# Patient Record
Sex: MALE | Race: AMERICAN INDIAN OR ALASKA NATIVE | Employment: UNEMPLOYED | URBAN - METROPOLITAN AREA
[De-identification: names, ages, dates, MRNs, and addresses within clinical notes are randomized per-mention and may not be internally consistent; named-entity substitution may affect disease eponyms.]

---

## 2019-03-10 ENCOUNTER — HOSPITAL ENCOUNTER (EMERGENCY)
Facility: CLINIC | Age: 19
Discharge: HOME | End: 2019-03-10
Admitting: PHYSICIAN ASSISTANT
Payer: COMMERCIAL

## 2019-03-10 ENCOUNTER — APPOINTMENT (OUTPATIENT)
Dept: GENERAL RADIOLOGY | Facility: CLINIC | Age: 19
End: 2019-03-10
Attending: EMERGENCY MEDICINE
Payer: COMMERCIAL

## 2019-03-10 VITALS
TEMPERATURE: 98 F | WEIGHT: 178 LBS | OXYGEN SATURATION: 100 % | BODY MASS INDEX: 24.11 KG/M2 | RESPIRATION RATE: 16 BRPM | SYSTOLIC BLOOD PRESSURE: 157 MMHG | DIASTOLIC BLOOD PRESSURE: 88 MMHG | HEIGHT: 72 IN

## 2019-03-10 DIAGNOSIS — M25.512 ACUTE PAIN OF LEFT SHOULDER: ICD-10-CM
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
## 2019-03-10 PROCEDURE — 99283 EMERGENCY DEPT VISIT LOW MDM: CPT

## 2019-03-10 PROCEDURE — 73030 X-RAY EXAM OF SHOULDER: CPT | Mod: LT

## 2019-03-10 ASSESSMENT — ENCOUNTER SYMPTOMS
ARTHRALGIAS: 1
JOINT SWELLING: 1
NECK PAIN: 0
NECK STIFFNESS: 0

## 2019-03-10 ASSESSMENT — MIFFLIN-ST. JEOR: SCORE: 1860.4

## 2019-03-10 NOTE — ED AVS SNAPSHOT
Emergency Department  [address] [place] [id number]  Phone:  [phone]  Fax:  [phone]                                    Jj Kennedy   MRN: [id number]    Department:   Emergency Department   Date of Visit:  3/10/2019           After Visit Summary Signature Page    I have received my discharge instructions, and my questions have been answered. I have discussed any challenges I see with this plan with the nurse or doctor.    ..........................................................................................................................................  Patient/Patient Representative Signature      ..........................................................................................................................................  Patient Representative Print Name and Relationship to Patient    ..................................................               ................................................  Date                                   Time    ..........................................................................................................................................  Reviewed by Signature/Title    ...................................................              ..............................................  Date                                               Time          22EPIC Rev 08/18

## 2019-03-11 NOTE — ED PROVIDER NOTES
History   Chief Complaint:  Shoulder injury    HPI   Jj Kennedy is a 18 year old male, who presents with girlfriend to the ED for evaluation of shoulder injury. The patient reports walking 20 minutes ago, slipped on some ice, and fell on his left shoulder. He says the pain is the same as it was when it first occurred. The patient notes it hurts to move the arm and the pain extends to the left side of the neck. The patient denies any head injury, neck injury, loss of consciousness, or any other symptoms or injuries.      Allergies:  No known drug allergies    Medications:    The patient is not currently taking any prescribed medications.    Past Medical History:    Myopia    Past Surgical History:    History reviewed. No pertinent surgical history.    Family History:    Hypertension  Diabetes    Social History:  Smoking status: Never  Alcohol use: No  Marital Status:  Single [1]    Review of Systems   Musculoskeletal: Positive for arthralgias (Left shoulder) and joint swelling. Negative for neck pain and neck stiffness.   All other systems reviewed and are negative.    Physical Exam     Patient Vitals for the past 24 hrs:   BP Temp Temp src Heart Rate Resp SpO2 Height Weight   03/10/19 2144 157/88 98  F (36.7  C) Temporal 60 16 100 % 1.829 m (6') 80.7 kg (178 lb)     Physical Exam  General: Well appearing, well nourished. Normal mood and affect.  Skin: Good turgor, no rash, no unusual bruising or prominent lesions.  HEENT: Head: Normocephalic, atraumatic, no visible masses.   Eyes: Conjunctiva clear.  Ears: EACs clear, TMs translucent.   Cardiac: Normal rate and regular rhythm, no murmur or gallop.   Lungs: Clear to auscultation.  Abdomen: Abdomen soft, non-tender. No rebound tenderness of guarding.   Musculoskeletal: Left Shoulder: Mild tenderness palpation throughout the anterior shoulder, proximal deltoid.  No significant edema.  No erythema or abrasions.  There is also mild tenderness palpation  throughout the bicipital groove.  Mild pain with speeds test.  There is also discomfort with Neer's.  Positive empty can.  Pain with external rotation against resistance.  Pain with abduction.  Mild pain throughout left trapezius and cervical paraspinous muscles.  Mild muscular spasming noted in these regions. No tenderness palpation throughout the clavicle, AC joint, scapula.  The glenohumeral joint is intact.  No dislocation is palpated or appreciated clinically. Proximal, mid, distal humerus are all nontender.  Normal adduction.  Axillary nerve function is intact.  Brachial and radial artery pulses normal.  Normal capillary refill throughout digits of this upper extremity.  Median, ulnar, radial nerve distally intact.  Full range of motion of elbow without difficulty.  No overlying erythema, rash, ecchymosis, or other lesions over the shoulder. Normal gait and station.  No tenderness palpation along the cervical, thoracic, lumbar spine.  Neurologic: Oriented x 3. GCS: 15.  Psychiatric: Intact recent and remote memory, judgment and insight, normal mood and affect.     Emergency Department Course   Imaging:  Radiographic findings were communicated with the patient who voiced understanding of the findings.  Shoulder XR, 3 views, G/E Left:  Three views left shoulder. No fracture or dislocation. No  significant soft tissue swelling.    Imaging independently reviewed and agree with radiologist interpretation.    Emergency Department Course:  Past medical records, nursing notes, and vitals reviewed.  2217: I performed an exam of the patient and obtained history, as documented above.    Findings and plan explained to the Patient. Patient discharged home with instructions regarding supportive care, medications, and reasons to return. The importance of close follow-up was reviewed.     Impression & Plan    Medical Decision Making:  Jj Kennedy is a 18 year old male who presents for evaluation of shoulder  pain. Details of the patient's history can be noted in the HPI. Differential diagnosis included sprain, strain, fracture, dislocation, contusion, amongst others. Due to concern for fracture, xray obtained. This returned showing no bony abnormalities. Additionally, on exam, no signs of septic arthritis, gout, pseudogout, fracture, cellulitis, etc.The patients neurovascular status is normal.  The patient did have pain with range of motion exercises of the left shoulder.  I suspect that he may have injured his rotator cuff.  His head to toe trauma exam was otherwise negative for other injury.  He was placed in a sling here today.  Rest, ice, ibuprofen, Tylenol at home to help with discomfort.  He will follow-up with [facility] orthopedics within the next few days for recheck.  Range of motion exercises discussed. All questions were answered prior to the patient's discharge. He was in agreement with the plan stated above.     Diagnosis:    ICD-10-CM   1. Acute pain of left shoulder M25.512   2. Fall, initial encounter W19.XXXA     Disposition:  discharged to home    Bandar Wilkinson  3/10/2019    EMERGENCY DEPARTMENT  Scribe Disclosure:  I, Bandar Wilkinson, am serving as a scribe at 10:17 PM on 3/10/2019 to document services personally performed by CATHIE Quezada,  based on my observations and the provider's statements to me.    This was created at least in part with a voice recognition software. Mistakes/typos may be present.        Rakel Calvert PA  03/10/19 2537

## 2019-03-11 NOTE — DISCHARGE INSTRUCTIONS
Wear the sling at home to help with discomfort.  Tylenol, ibuprofen, rest, ice to help with pain.  Call [facility] orthopedics tomorrow and schedule follow-up.  Return to the emergency department for any change worsening symptoms, new concerns.  Pendulum exercises for shoulder and elbow range of motion exercises as noted below.

## 2019-03-15 ENCOUNTER — OFFICE VISIT (OUTPATIENT)
Dept: FAMILY MEDICINE | Facility: CLINIC | Age: 19
End: 2019-03-15
Payer: COMMERCIAL

## 2019-03-15 VITALS
SYSTOLIC BLOOD PRESSURE: 140 MMHG | BODY MASS INDEX: 24.38 KG/M2 | OXYGEN SATURATION: 99 % | HEIGHT: 72 IN | RESPIRATION RATE: 12 BRPM | TEMPERATURE: 98.2 F | WEIGHT: 180 LBS | HEART RATE: 67 BPM | DIASTOLIC BLOOD PRESSURE: 84 MMHG

## 2019-03-15 DIAGNOSIS — S42.032D CLOSED DISPLACED FRACTURE OF ACROMIAL END OF LEFT CLAVICLE WITH ROUTINE HEALING, SUBSEQUENT ENCOUNTER: ICD-10-CM

## 2019-03-15 DIAGNOSIS — M25.512 ACUTE PAIN OF LEFT SHOULDER: Primary | ICD-10-CM

## 2019-03-15 DIAGNOSIS — R03.0 ELEVATED BLOOD PRESSURE READING WITHOUT DIAGNOSIS OF HYPERTENSION: ICD-10-CM

## 2019-03-15 PROCEDURE — 99214 OFFICE O/P EST MOD 30 MIN: CPT | Performed by: FAMILY MEDICINE

## 2019-03-15 ASSESSMENT — MIFFLIN-ST. JEOR: SCORE: 1869.47

## 2019-03-15 NOTE — PROGRESS NOTES
SUBJECTIVE:   Jj Kennedy is a 18 year old male who presents to clinic today for the following health issues:    ED/UC Followup:     Facility:   ED   Date of visit: 03/10/2019  Reason for visit: Acute pain of left shoulder   Current Status: still has shoulder pain      Left shoulder still hurts. He feels that ROM is better.   Pain is constant, located on left shoulder, mild to moderate, stabbing, aggravated by pressure and movement and relieved with rest.   A/s with collar bone swelling.   No numbness/tinlging of extremities.   He feels tightness going up his neck.       Problem list and histories reviewed & adjusted, as indicated.  Additional history: as documented        Reviewed and updated as needed this visit by clinical staff       Reviewed and updated as needed this visit by Provider         ROS:  Constitutional, HEENT, cardiovascular, pulmonary, gi and gu systems are negative, except as otherwise noted.    OBJECTIVE:     /83 (BP Location: Right arm, Patient Position: Sitting, Cuff Size: Adult Regular)   Pulse 67   Temp 98.2  F (36.8  C) (Oral)   Resp 12   Ht 1.829 m (6')   Wt 81.6 kg (180 lb)   SpO2 99%   BMI 24.41 kg/m    Body mass index is 24.41 kg/m .  GENERAL: healthy, alert and no distress  EYES: Eyes grossly normal to inspection  HENT/SKIN:  Mild ecchymosis, edema and tenderness over left distal clavicle and shoulder, limited ROM of left shoulder, unable to assess neer's or hawkin's due to pain and limited ROM  NEURO: Normal      Diagnostic Test Results:  none     ASSESSMENT/PLAN:     ## Acute pain of left shoulder  - due to symptoms and exam, concern for rotator cuff injury and ordered MRI for further evaluation  - discussed with pt that he can take OTC tylenol or Ibuprofen PRN  - reviewed xray results with patient  - MR Shoulder Left w/o Contrast; Future    ## Elevated blood pressure without diagnosis of HTN   - likely due to pain  - continue to monitor     Addendum    RN, can you please inform pt that MRI showed minimally displaced distal clavicular fracture with sprain of ligament. Partial thickness tear of supraspinatus tendon and contusion (brusing) of deltoid muscle. Due to the fracture, he would need to be seen by orthopedic surgeon ASAP and start wearing sling. I have placed a referral. Can patient schedule an appointment today.         pression:  1. Minimally displaced distal clavicular fracture with associated  high-grade sprain of the coracoclavicular ligament and extensive  surrounding soft tissue edema.  2. Intrasubstance partial-thickness tear of the supraspinatus tendon.  Mild tendinopathy of the infraspinatus tendon.  3. Proximal deltoid muscular contusion.     [Consider Follow Up: Distal clavicular fracture and sprain of the  coracoclavicular ligament.]    This report will be copied to the [facility] to ensure a  provider acknowledges the finding.     I have personally reviewed the examination and initial interpretation  and I agree with the findings.        Alvaro Adorno MD  [facility]

## 2019-03-18 ENCOUNTER — ANCILLARY PROCEDURE (OUTPATIENT)
Dept: MRI IMAGING | Facility: CLINIC | Age: 19
End: 2019-03-18
Attending: FAMILY MEDICINE
Payer: COMMERCIAL

## 2019-03-18 DIAGNOSIS — M25.512 ACUTE PAIN OF LEFT SHOULDER: ICD-10-CM

## 2019-03-18 LAB — RADIOLOGIST FLAGS: NORMAL

## 2019-03-19 ENCOUNTER — TELEPHONE (OUTPATIENT)
Dept: FAMILY MEDICINE | Facility: CLINIC | Age: 19
End: 2019-03-19

## 2019-03-19 NOTE — TELEPHONE ENCOUNTER
RECORDS RECEIVED FROM: Scheduled per pt, Closed displaced fracture of acromial end of left clavicle with routine healing, subsequent encounter   DATE RECEIVED: 03/19/19    NOTES STATUS DETAILS   OFFICE NOTE from referring provider Internal Dr. Adorno 3/15/19   OFFICE NOTE from other specialist N/A    DISCHARGE SUMMARY from hospital N/A    DISCHARGE REPORT from the ER Internal 3/10/19   OPERATIVE REPORT N/A    MEDICATION LIST Internal    IMPLANT RECORD/STICKER N/A    LABS     CBC/DIFF N/A    CULTURES N/A    INJECTIONS DONE IN RADIOLOGY N/A    MRI Internal 3/18/19    CT SCAN N/A    XRAYS (IMAGES & REPORTS) Internal 3/10/19   TUMOR     PATHOLOGY  Slides & report N/A

## 2019-03-20 ENCOUNTER — PRE VISIT (OUTPATIENT)
Dept: ORTHOPEDICS | Facility: CLINIC | Age: 19
End: 2019-03-20

## 2019-03-20 ENCOUNTER — OFFICE VISIT (OUTPATIENT)
Dept: ORTHOPEDICS | Facility: CLINIC | Age: 19
End: 2019-03-20
Payer: COMMERCIAL

## 2019-03-20 VITALS — BODY MASS INDEX: 24.37 KG/M2 | WEIGHT: 179.9 LBS | HEIGHT: 72 IN

## 2019-03-20 DIAGNOSIS — S42.035A CLOSED NONDISPLACED FRACTURE OF ACROMIAL END OF LEFT CLAVICLE, INITIAL ENCOUNTER: Primary | ICD-10-CM

## 2019-03-20 ASSESSMENT — MIFFLIN-ST. JEOR: SCORE: 1869.13

## 2019-03-20 NOTE — NURSING NOTE
"Reason For Visit:   Chief Complaint   Patient presents with     Consult     Left distal clavicle fracutre with rotator cuff involvement.  DOI: 3/10/2019       PCP: No Ref-Primary, Physician  Referring:   LISSETT CHAPIN    ?  No  Occupation .  Currently working? Yes.  Work status?  Part-time.  Date of injury: 3/10/2019  Type of injury: Slipped and fell backwards on the ice.  Date of surgery: NA  Type of surgery: NA.  Smoker: No  Request smoking cessation information: No    Left hand dominant    SANE score  Affected shoulder: Left  Right shoulder SANE: 100  Left shoulder SANE: 20    Ht 1.829 m (6' 0.01\")   Wt 81.6 kg (179 lb 14.3 oz)   BMI 24.39 kg/m        Pain Assessment  Patient Currently in Pain: Yes  0-10 Pain Scale: 7  Primary Pain Location: Shoulder(Left)  Pain Descriptors: Tightness  Alleviating Factors: NSAIDS, Ice  Aggravating Factors: Movement    Keiry Tapia ATC        "

## 2019-03-20 NOTE — LETTER
Verification of Appointment  2019     Seen today: yes    Patient:  Jj Kennedy  :   2000  Physician: Elva Olivares MD      Jj Kennedy is under our care for a left shoulder injury. Due to his restrictions and pain from a fracture he missed school 3/18-3/20.   He is not able to push, pull, lift, or carry more than 2 pounds with left arm. Left arm in sling majority of the day for comfort.       Electronically signed by Elva Olivares MD

## 2019-03-20 NOTE — PROGRESS NOTES
CHIEF CONCERN:  ***    HISTORY OF PRESENT ILLNESS:  ***    No past medical history on file.    No past surgical history on file.    Current Outpatient Medications   Medication Sig Dispense Refill     clindamycin (CLINDAMAX) 1 % lotion Apply topically 2 times daily (Patient not taking: Reported on 3/15/2019) 60 mL 11     tretinoin (RETIN-A) 0.025 % gel Spread a pea size amount into affected area topically at bedtime.  Use sunscreen SPF>20. (Patient not taking: Reported on 3/15/2019) 45 g 11        No Known Allergies    SOCIAL HISTORY:    Social History     Socioeconomic History     Marital status: Single     Spouse name: Not on file     Number of children: Not on file     Years of education: Not on file     Highest education level: Not on file   Occupational History     Not on file   Social Needs     Financial resource strain: Not on file     Food insecurity:     Worry: Not on file     Inability: Not on file     Transportation needs:     Medical: Not on file     Non-medical: Not on file   Tobacco Use     Smoking status: Never Smoker     Smokeless tobacco: Never Used   Substance and Sexual Activity     Alcohol use: No     Drug use: No     Sexual activity: No   Lifestyle     Physical activity:     Days per week: Not on file     Minutes per session: Not on file     Stress: Not on file   Relationships     Social connections:     Talks on phone: Not on file     Gets together: Not on file     Attends Bahai service: Not on file     Active member of club or organization: Not on file     Attends meetings of clubs or organizations: Not on file     Relationship status: Not on file     Intimate partner violence:     Fear of current or ex partner: Not on file     Emotionally abused: Not on file     Physically abused: Not on file     Forced sexual activity: Not on file   Other Topics Concern     Not on file   Social History Narrative    FAMILY INFORMATION     Date: December 12, 2006    Parent #1      Name: Lela Ramey    Gender: female   : 75      Education: Associates Degree   Occupation: Peer Parent Leader        Parent #2      Name: Jj Kennedy   Gender: male   : 10/26/1974     Education: Some College   Occupation: [employer]         Siblings:      Name: Pj Kennedy    : 95    Name: Elham Kennedy    : 2004            Relationship Status of Parent(s): partnering    Who does the child live with? mother, father and sister(s)    What language(s) is/are spoken at home? English               FAMILY HISTORY: Reviewed in EMR      REVIEW OF SYSTEMS: Positive for that noted in past medical history and history of present illness and otherwise reviewed in EMR     PHYSICAL EXAM:    ***    IMAGING:  ***    ASSESSMENT:    ***    PLAN:  ***    Elva Olivares MD

## 2019-03-20 NOTE — PROGRESS NOTES
CHIEF CONCERN:  Left shoulder injury    HISTORY OF PRESENT ILLNESS:  Mr. Kennedy is a 18 year old young man who is seen today for an injury to his left shoulder on 3/10/19. He slipped on the ice and fell. He was seen in the [facility] ED. He followed up with primary care who obtained an MRI and he was then referred here. He denies other injuries. No numbness or tingling.    PMHx: reviewed with patient and in EMR    Current Outpatient Medications   Medication Sig Dispense Refill     clindamycin (CLINDAMAX) 1 % lotion Apply topically 2 times daily (Patient not taking: Reported on 3/15/2019) 60 mL 11     tretinoin (RETIN-A) 0.025 % gel Spread a pea size amount into affected area topically at bedtime.  Use sunscreen SPF>20. (Patient not taking: Reported on 3/15/2019) 45 g 11        No Known Allergies    SOCIAL HISTORY:    Social History     Socioeconomic History     Marital status: Single     Spouse name: Not on file     Number of children: Not on file     Years of education: Not on file     Highest education level: Not on file   Occupational History     Not on file   Social Needs     Financial resource strain: Not on file     Food insecurity:     Worry: Not on file     Inability: Not on file     Transportation needs:     Medical: Not on file     Non-medical: Not on file   Tobacco Use     Smoking status: Never Smoker     Smokeless tobacco: Never Used   Substance and Sexual Activity     Alcohol use: No     Drug use: No     Sexual activity: No   Lifestyle     Physical activity:     Days per week: Not on file     Minutes per session: Not on file     Stress: Not on file   Relationships     Social connections:     Talks on phone: Not on file     Gets together: Not on file     Attends Alevism service: Not on file     Active member of club or organization: Not on file     Attends meetings of clubs or organizations: Not on file     Relationship status: Not on file     Intimate partner violence:     Fear of current or ex  partner: Not on file     Emotionally abused: Not on file     Physically abused: Not on file     Forced sexual activity: Not on file   Other Topics Concern     Not on file   Social History Narrative    FAMILY INFORMATION     Date: 2006    Parent #1      Name: Lela Ramey   Gender: female   : 75      Education: Associates Degree   Occupation: Peer Parent Leader        Parent #2      Name: Jj Kennedy   Gender: male   : 10/26/1974     Education: Some College   Occupation: [employer]         Siblings:      Name: Pj Kennedy    : 95    Name: Elham Kennedy    : 2004            Relationship Status of Parent(s): partnering    Who does the child live with? mother, father and sister(s)    What language(s) is/are spoken at home? English               FAMILY HISTORY: Reviewed in EMR      REVIEW OF SYSTEMS: Positive for that noted in past medical history and history of present illness and otherwise reviewed in EMR     PHYSICAL EXAM:    Young adult male in no acute distress. Articulates and communicates with normal affect.  Respirations even and unlabored  Focused upper extremity exam: Skin intact. No erythema. Sensation intact all dermatomes into the hand to light touch. EPL, FPL, and Intrinsics intact. Right shoulder active motion is FE to 180, ER at side to 80, and IR to T10. Left shoulder is not tested given injury but patient demonstrates active abduction to 90 and some IR/ER.    IMAGING:  Left shoulder xrays from 3/10/19 demonstrate (informed by MRI) a subtle cortical irregularity at the distal clavicle consistent with fracture    Left shoulder MRI 3/18/19 demonstrates a non/minimally displaced distal clavicle fracture with expected surrounding edema and possible C-C ligament sprain. There is a mild intrasubstance partial thickness supra tear.    ASSESSMENT:    1. Left minimally displaced distal clavicle fracture  2. Left low grade partial thickness  supra tear    PLAN:  I reviewed the imaging and exam findings with the patient and his father. I discussed that his distal clavicle fracture appears to represent a stable pattern with acceptable healing rate. I would advise nonoperative treatment at this time. He will remain in his sling and I will see him back in 2-3 weeks to progress his activity.    Elva Olivares MD    Answers for HPI/ROS submitted by the patient on 3/20/2019   General Symptoms: No  Skin Symptoms: No  HENT Symptoms: No  EYE SYMPTOMS: No  HEART SYMPTOMS: No  LUNG SYMPTOMS: No  INTESTINAL SYMPTOMS: No  URINARY SYMPTOMS: No  REPRODUCTIVE SYMPTOMS: No  SKELETAL SYMPTOMS: No  BLOOD SYMPTOMS: No  NERVOUS SYSTEM SYMPTOMS: No  MENTAL HEALTH SYMPTOMS: No  PEDS Symptoms: No

## 2019-03-20 NOTE — LETTER
3/20/2019       RE: Jj Kennedy  [address]  [facility] [address]     Dear Colleague,    Thank you for referring your patient, Jj Kennedy, to the [facility]. Please see a copy of my visit note below.    CHIEF CONCERN:  Left shoulder injury    HISTORY OF PRESENT ILLNESS:  Mr. Kenndey is a 18 year old young man who is seen today for an injury to his left shoulder on 3/10/19. He slipped on the ice and fell. He was seen in the [facility] ED. He followed up with primary care who obtained an MRI and he was then referred here. He denies other injuries. No numbness or tingling.    PMHx: reviewed with patient and in EMR    Current Outpatient Medications   Medication Sig Dispense Refill     clindamycin (CLINDAMAX) 1 % lotion Apply topically 2 times daily (Patient not taking: Reported on 3/15/2019) 60 mL 11     tretinoin (RETIN-A) 0.025 % gel Spread a pea size amount into affected area topically at bedtime.  Use sunscreen SPF>20. (Patient not taking: Reported on 3/15/2019) 45 g 11        No Known Allergies    SOCIAL HISTORY:    Social History     Socioeconomic History     Marital status: Single     Spouse name: Not on file     Number of children: Not on file     Years of education: Not on file     Highest education level: Not on file   Occupational History     Not on file   Social Needs     Financial resource strain: Not on file     Food insecurity:     Worry: Not on file     Inability: Not on file     Transportation needs:     Medical: Not on file     Non-medical: Not on file   Tobacco Use     Smoking status: Never Smoker     Smokeless tobacco: Never Used   Substance and Sexual Activity     Alcohol use: No     Drug use: No     Sexual activity: No   Lifestyle     Physical activity:     Days per week: Not on file     Minutes per session: Not on file     Stress: Not on file   Relationships     Social connections:     Talks  on phone: Not on file     Gets together: Not on file     Attends Sabianism service: Not on file     Active member of club or organization: Not on file     Attends meetings of clubs or organizations: Not on file     Relationship status: Not on file     Intimate partner violence:     Fear of current or ex partner: Not on file     Emotionally abused: Not on file     Physically abused: Not on file     Forced sexual activity: Not on file   Other Topics Concern     Not on file   Social History Narrative    FAMILY INFORMATION     Date: 2006    Parent #1      Name: Lela Ramey   Gender: female   : 75      Education: Associates Degree   Occupation: Peer Parent Leader        Parent #2      Name: Jj Kennedy   Gender: male   : 10/26/1974     Education: Some College   Occupation: [employer]         Siblings:      Name: Pj Kennedy    : 95    Name: Elham Kennedy    : 2004            Relationship Status of Parent(s): partnering    Who does the child live with? mother, father and sister(s)    What language(s) is/are spoken at home? English               FAMILY HISTORY: Reviewed in EMR      REVIEW OF SYSTEMS: Positive for that noted in past medical history and history of present illness and otherwise reviewed in EMR     PHYSICAL EXAM:    Young adult male in no acute distress. Articulates and communicates with normal affect.  Respirations even and unlabored  Focused upper extremity exam: Skin intact. No erythema. Sensation intact all dermatomes into the hand to light touch. EPL, FPL, and Intrinsics intact. Right shoulder active motion is FE to 180, ER at side to 80, and IR to T10. Left shoulder is not tested given injury but patient demonstrates active abduction to 90 and some IR/ER.    IMAGING:  Left shoulder xrays from 3/10/19 demonstrate (informed by MRI) a subtle cortical irregularity at the distal clavicle consistent with fracture    Left shoulder MRI  3/18/19 demonstrates a non/minimally displaced distal clavicle fracture with expected surrounding edema and possible C-C ligament sprain. There is a mild intrasubstance partial thickness supra tear.    ASSESSMENT:    1. Left minimally displaced distal clavicle fracture  2. Left low grade partial thickness supra tear    PLAN:  I reviewed the imaging and exam findings with the patient and his father. I discussed that his distal clavicle fracture appears to represent a stable pattern with acceptable healing rate. I would advise nonoperative treatment at this time. He will remain in his sling and I will see him back in 2-3 weeks to progress his activity.    Elva Olivares MD

## 2019-04-10 ENCOUNTER — ANCILLARY PROCEDURE (OUTPATIENT)
Dept: GENERAL RADIOLOGY | Facility: CLINIC | Age: 19
End: 2019-04-10
Attending: ORTHOPAEDIC SURGERY
Payer: COMMERCIAL

## 2019-04-10 ENCOUNTER — OFFICE VISIT (OUTPATIENT)
Dept: ORTHOPEDICS | Facility: CLINIC | Age: 19
End: 2019-04-10
Payer: COMMERCIAL

## 2019-04-10 DIAGNOSIS — S42.035D CLOSED NONDISPLACED FRACTURE OF ACROMIAL END OF LEFT CLAVICLE WITH ROUTINE HEALING, SUBSEQUENT ENCOUNTER: Primary | ICD-10-CM

## 2019-04-10 DIAGNOSIS — S42.002A CLOSED FRACTURE OF LEFT CLAVICLE: Primary | ICD-10-CM

## 2019-04-10 DIAGNOSIS — S42.002A CLOSED FRACTURE OF LEFT CLAVICLE: ICD-10-CM

## 2019-04-10 NOTE — LETTER
4/10/2019       RE: Jj Kennedy  [address]  [facility] [address]     Dear Colleague,    Thank you for referring your patient, Jj Kennedy, to the [facility] at [facility]. Please see a copy of my visit note below.    CHIEF CONCERN:  Left distal clavicle fracture  DATE OF INJURY: 3/10/19    HISTORY OF PRESENT ILLNESS:  Jj is a 18 year old young man who returns today for follow up on his clavicle fracture. He reports his shoulder feels very good and he is able to do quite a bit more than he could a few weeks ago.    PHYSICAL EXAM:    Young adult male in no acute distress. Articulates and communicates with normal affect. Seen with his mother.  Respirations even and unlabored  Focused left upper extremity exam: Skin intact. No tenderness at fracture site. NVI into hand. Shoulder active motion is full without pain in FE, ER, and IR.     IMAGING:  Left shoulder xrays demonstrate increase callous formation consistent with fracture healing.    ASSESSMENT:  1. One month status post above injury    PLAN:  Discussed plan to gradually progress to/return to regular activities. Advised against heavy lifting/weight bearing for next month but can otherwise progress as tolerated.  He will reutnr in 4 weeks if symptoms/activities not fully resolved.    Elva Olivares MD

## 2019-04-10 NOTE — NURSING NOTE
Reason For Visit:   Chief Complaint   Patient presents with     RECHECK     4 week Follow up Closed displaced fracture of acromial end of left clavicle. DOI: 3/10/2019        PCP: No Ref-Primary, Physician  Referring:   LISSETT CHAPIN     ?  No  Occupation .  Currently working? Yes.  Work status?  Part-time.  Date of injury: 3/10/2019  Type of injury: Slipped and fell backwards on the ice.  Date of surgery: NA  Type of surgery: NA.  Smoker: No  Request smoking cessation information: No     Left hand dominant      SANE score  Affected shoulder: Left  Right shoulder SANE: 100  Left shoulder SANE: 80    There were no vitals taken for this visit.      Pain Assessment  Patient Currently in Pain: Cole Tapia, ATC

## 2019-04-27 NOTE — PROGRESS NOTES
CHIEF CONCERN:  Left distal clavicle fracture  DATE OF INJURY: 3/10/19    HISTORY OF PRESENT ILLNESS:  Jj is a 18 year old young man who returns today for follow up on his clavicle fracture. He reports his shoulder feels very good and he is able to do quite a bit more than he could a few weeks ago.    PHYSICAL EXAM:    Young adult male in no acute distress. Articulates and communicates with normal affect. Seen with his mother.  Respirations even and unlabored  Focused left upper extremity exam: Skin intact. No tenderness at fracture site. NVI into hand. Shoulder active motion is full without pain in FE, ER, and IR.     IMAGING:  Left shoulder xrays demonstrate increase callous formation consistent with fracture healing.    ASSESSMENT:  1. One month status post above injury    PLAN:  Discussed plan to gradually progress to/return to regular activities. Advised against heavy lifting/weight bearing for next month but can otherwise progress as tolerated.  He will reutnr in 4 weeks if symptoms/activities not fully resolved.    Elva Olivares MD

## 2020-06-14 ENCOUNTER — HOSPITAL ENCOUNTER (EMERGENCY)
Facility: CLINIC | Age: 20
Discharge: HOME | End: 2020-06-14
Attending: EMERGENCY MEDICINE | Admitting: EMERGENCY MEDICINE
Payer: MEDICAID

## 2020-06-14 VITALS
RESPIRATION RATE: 18 BRPM | SYSTOLIC BLOOD PRESSURE: 128 MMHG | HEART RATE: 71 BPM | OXYGEN SATURATION: 97 % | DIASTOLIC BLOOD PRESSURE: 85 MMHG | TEMPERATURE: 98.2 F

## 2020-06-14 DIAGNOSIS — S61.214A LACERATION OF RIGHT RING FINGER WITHOUT FOREIGN BODY WITHOUT DAMAGE TO NAIL: ICD-10-CM

## 2020-06-14 DIAGNOSIS — S61.214A LACERATION OF RIGHT RING FINGER WITHOUT FOREIGN BODY, NAIL DAMAGE STATUS UNSPECIFIED, INITIAL ENCOUNTER: ICD-10-CM

## 2020-06-14 PROCEDURE — 99282 EMERGENCY DEPT VISIT SF MDM: CPT | Mod: 25 | Performed by: EMERGENCY MEDICINE

## 2020-06-14 PROCEDURE — 99283 EMERGENCY DEPT VISIT LOW MDM: CPT

## 2020-06-14 PROCEDURE — 12001 RPR S/N/AX/GEN/TRNK 2.5CM/<: CPT

## 2020-06-14 PROCEDURE — 12001 RPR S/N/AX/GEN/TRNK 2.5CM/<: CPT | Mod: Z6 | Performed by: EMERGENCY MEDICINE

## 2020-06-14 RX ORDER — LIDOCAINE HYDROCHLORIDE 10 MG/ML
INJECTION, SOLUTION EPIDURAL; INFILTRATION; INTRACAUDAL; PERINEURAL
Status: DISCONTINUED
Start: 2020-06-14 | End: 2020-06-14 | Stop reason: HOSPADM

## 2020-06-14 ASSESSMENT — ENCOUNTER SYMPTOMS
SHORTNESS OF BREATH: 0
ABDOMINAL PAIN: 0
FEVER: 0

## 2020-06-14 NOTE — ED PROVIDER NOTES
[facility] EMERGENCY DEPARTMENT ([facility])    6/14/20   History     Chief Complaint   Patient presents with     Hand Injury     HPI  Jj Kennedy is a 19 year old male who presents to the Emergency Department for evaluation of a right ring finger laceration.  Patient states that he was cooking earlier today when he lost control of the knife and accidentally cut his right fourth digit.  Patient states that he can still move the fingers, and was able to control the bleeding with pressure.  He is left-handed.    Social: was cooking with his mother.  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  PAST MEDICAL HISTORY: No past medical history on file.    PAST SURGICAL HISTORY: No past surgical history on file.    Past medical history, past surgical history, medications, and allergies were reviewed with the patient. Additional pertinent items: None    FAMILY HISTORY:   Family History   Problem Relation Age of Onset     Hypertension Mother      Diabetes Mother        SOCIAL HISTORY:   Social History     Tobacco Use     Smoking status: Never Smoker     Smokeless tobacco: Never Used   Substance Use Topics     Alcohol use: No     Social history was reviewed with the patient. Additional pertinent items: None      Patient's Medications   New Prescriptions    No medications on file   Previous Medications    CLINDAMYCIN (CLINDAMAX) 1 % LOTION    Apply topically 2 times daily    TRETINOIN (RETIN-A) 0.025 % GEL    Spread a pea size amount into affected area topically at bedtime.  Use sunscreen SPF>20.   Modified Medications    No medications on file   Discontinued Medications    No medications on file        No Known Allergies     Review of Systems   Constitutional: Negative for fever.   Respiratory: Negative for shortness of breath.     Cardiovascular: Negative for chest pain.   Gastrointestinal: Negative for abdominal pain.   Musculoskeletal:        See hpi   All other systems reviewed and are negative.        Physical Exam   BP: 126/72  Pulse: 83  Temp: 97.5  F (36.4  C)  SpO2: 96 %      Physical Exam  Nursing note reviewed.   HENT:      Head: Normocephalic and atraumatic.   Cardiovascular:      Rate and Rhythm: Normal rate and regular rhythm.   Pulmonary:      Effort: Pulmonary effort is normal.      Breath sounds: Normal breath sounds.   Musculoskeletal:      Right hand: He exhibits laceration. He exhibits no bony tenderness, normal two-point discrimination and no deformity. Normal sensation noted. Normal strength noted.        Hands:    Skin:     General: Skin is warm and dry.   Neurological:      Mental Status: He is alert and oriented to person, place, and time.   Psychiatric:         Mood and Affect: Mood normal.         Behavior: Behavior normal.       Physical Exam   Constitutional:   well nourished, well developed, resting comfortably   HENT:   Head: Normocephalic and atraumatic.     ED Course   2:59 PM  The patient was seen and examined by Wanda Islas MD in Room ED03.        Procedures       [facility] Procedure Note        Laceration Repair:    Performed by: Wanda Islas MD  Authorized by: Wanda Islas MD  Consent given by: Patient who states understanding of the procedure being performed after discussing the risks, benefits and alternatives.    Preparation: Patient was prepped and draped in usual sterile fashion.  Irrigation solution: saline    Body area:ring finger  Laceration length: 2cm  Contamination: The wound is not contaminated.  Foreign bodies:none  Tendon involvement: none  Anesthesia: Local  Local anesthetic: Lidocaine     1%  Anesthetic total: 3ml    Debridement: none  Skin closure: Closed with 4 x 4.0 Ethilon  Technique: interrupted  Approximation: close  Approximation difficulty: simple    Patient  tolerance: Patient tolerated the procedure well with no immediate complications.                    No results found for this or any previous visit (from the past 24 hour(s)).  Medications   lidocaine (PF) (XYLOCAINE) 1 % injection (has no administration in time range)             Assessments & Plan (with Medical Decision Making)       I have reviewed the nursing notes.  Emergency Department course:  The patient was seen and examined at 1450 pm.   ED procedure note: Patient repair: Please see details above.  The wound was anesthetized with lidocaine.  Irrigated, sutured with 4-0 Ethilon.  Bacitracin and a tube gauze dressing were placed.  Patient's last tetanus was in 2011.    The patient is a 19-year-old male who presents with right ring finger laceration with a clean knife.  This laceration has been repaired  and a tube gauze dressing placed.  Sutures out in 12 to 14 days.  The patient was discharged with wound care precautions and reasons to return.  I have reviewed the findings, diagnosis, plan and need for follow up with the patient.    New Prescriptions    No medications on file       Final diagnoses:   Laceration of right ring finger without foreign body, nail damage status unspecified, initial encounter   I, Kervin Miles , am serving as a trained medical scribe to document services personally performed by Wanda Islas MD, based on the provider's statements to me.      IWanda MD, was physically present and have reviewed and verified the accuracy of this note documented by Kervin Miles.   This note was created in part by the use of Dragon voice recognition dictation system. Inadvertent grammatical errors and typographical errors may still exist.  Wanda Islas MD    6/14/2020   [facility], EMERGENCY DEPARTMENT     Wanda Islas MD  06/14/20 [id number]

## 2020-06-14 NOTE — ED TRIAGE NOTES
Pt has lacs to 3 & 4 th digits of his right hand from kitchen knife PTA.  Pt has FROM/CMS intact.  Bleeding controled at present.

## 2020-06-14 NOTE — ED AVS SNAPSHOT
[facility], [place], Emergency Department  [address]  [facility] [address]  Phone:  [phone]  Fax:  [phone]                                    Jj Kennedy   MRN: [id number]    Department:  [facility], Emergency Department   Date of Visit:  6/14/2020           After Visit Summary Signature Page    I have received my discharge instructions, and my questions have been answered. I have discussed any challenges I see with this plan with the nurse or doctor.    ..........................................................................................................................................  Patient/Patient Representative Signature      ..........................................................................................................................................  Patient Representative Print Name and Relationship to Patient    ..................................................               ................................................  Date                                   Time    ..........................................................................................................................................  Reviewed by Signature/Title    ...................................................              ..............................................  Date                                               Time          22EPIC Rev 08/18

## 2021-08-02 ENCOUNTER — HOSPITAL ENCOUNTER (EMERGENCY)
Facility: CLINIC | Age: 21
Discharge: HOME | End: 2021-08-03
Admitting: EMERGENCY MEDICINE
Payer: COMMERCIAL

## 2021-08-02 ENCOUNTER — APPOINTMENT (OUTPATIENT)
Dept: GENERAL RADIOLOGY | Facility: CLINIC | Age: 21
End: 2021-08-02
Attending: EMERGENCY MEDICINE
Payer: COMMERCIAL

## 2021-08-02 VITALS
WEIGHT: 180 LBS | DIASTOLIC BLOOD PRESSURE: 73 MMHG | RESPIRATION RATE: 15 BRPM | HEART RATE: 72 BPM | SYSTOLIC BLOOD PRESSURE: 136 MMHG | BODY MASS INDEX: 24.41 KG/M2 | TEMPERATURE: 98.4 F | OXYGEN SATURATION: 97 %

## 2021-08-02 PROCEDURE — 73610 X-RAY EXAM OF ANKLE: CPT | Mod: RT

## 2021-08-02 PROCEDURE — 999N000104 HC STATISTIC NO CHARGE: Performed by: EMERGENCY MEDICINE

## 2021-08-03 NOTE — ED PROVIDER NOTES
[facility] EMERGENCY DEPARTMENT ([facility])  8/02/21  History     Chief Complaint   Patient presents with     Ankle Pain     Playing basket ball and twisted his right ankle, swollen and painful     HPI  Jj Kennedy is an otherwise healthy 21 year old male who presents to the Emergency Department for evaluation of ankle pain.  Patient reports that he was playing basketball and he twisted his right ankle.***        Past Medical History  No past medical history on file.  No past surgical history on file.  clindamycin (CLINDAMAX) 1 % lotion  tretinoin (RETIN-A) 0.025 % gel      No Known Allergies  Family History  Family History   Problem Relation Age of Onset     Hypertension Mother      Diabetes Mother      Social History   Social History     Tobacco Use     Smoking status: Never Smoker     Smokeless tobacco: Never Used   Substance Use Topics     Alcohol use: No     Drug use: No      Past medical history, past surgical history, medications, allergies, family history, and social history were reviewed with the patient. No additional pertinent items.       Review of Systems  A complete review of systems was performed with pertinent positives and negatives noted in the HPI, and all other systems negative.    Physical Exam   BP: 136/73  Pulse: 72  Temp: 98.4  F (36.9  C)  Resp: 15  Weight: 81.6 kg (180 lb)  SpO2: 97 %  Physical Exam  ***  ED Course      Procedures       {ED Course Selections:919129}       No results found for any visits on 08/02/21.  Medications - No data to display     Assessments & Plan (with Medical Decision Making)   ***    I have reviewed the nursing notes. I have reviewed the findings, diagnosis, plan and need for follow up with the patient.    New Prescriptions    No medications on file       Final diagnoses:   None       --  ***  [facility] EMERGENCY DEPARTMENT  8/2/2021

## 2021-08-03 NOTE — ED NOTES
Pt decided to leave without seeing a MD.  Pt understands a MD wanted to speak to him about F/U care if needed.  Pt will come back if needed in the AM.

## 2023-03-30 NOTE — TELEPHONE ENCOUNTER
5 fr dilator used to dilate vessel
Left message to call back and ask to speak with an available triage nurse.    Patient does not have a [employer] account.  COLLIN GarnicaN, RN    
Patient called back and was informed of message per Dr. Adorno, along with orthopedic referral information and information for [facility] Orthopedic walk in clinic.    Patient verbalized understanding and in agreement with plan.    COLLIN GarnicaN, RN      
RN, can you please inform pt that MRI showed minimally displaced distal clavicular fracture with sprain of ligament. Partial thickness tear of supraspinatus tendon (rotator cuff muscle) and contusion (brusing) of deltoid muscle. Due to the fracture, he would need to be seen by orthopedic surgeon ASAP and start wearing sling. I have placed a referral. Can patient schedule an appointment today.         pression:  1. Minimally displaced distal clavicular fracture with associated  high-grade sprain of the coracoclavicular ligament and extensive  surrounding soft tissue edema.  2. Intrasubstance partial-thickness tear of the supraspinatus tendon.  Mild tendinopathy of the infraspinatus tendon.  3. Proximal deltoid muscular contusion.     [Consider Follow Up: Distal clavicular fracture and sprain of the  coracoclavicular ligament.]    This report will be copied to the [facility] to ensure a  provider acknowledges the finding.     I have personally reviewed the examination and initial interpretation  and I agree with the findings.    
Hide Additional Notes?: No
Additional Note: Benign in nature
Detail Level: Zone
Include Location In Plan?: Yes

## 2025-04-10 NOTE — DISCHARGE INSTRUCTIONS
Significant Event - Fall Note    Date/Time of Fall: April 10, 2025 at 1320    Fall huddle completed: Yes    Description of patient fall: pt was attempting to walk to the bathroom with staff. Was up 1 with a walker per pt/ot. Pt stood up and was able to take two steps with the walker and contact guard from staff before his knees gave out and patient was assisted to the ground on his bottom. No injury occurred.      Patient fell from: Standing     Activity when fall occurred: Ambulating with assistance or assistive devices and Toileting-related activities     Where did fall occur: Bathroom     Was the fall assisted: Assisted to floor    Who witnessed the fall: Staff    Patient narrative of fall: \"I wasn't strong enough. My knees gave out\"    Staff narrative of fall: pt took two steps with the walker and contact guard from staff before knees gave out and he slowly fell assisted to the ground on his bottom.     Name of Provider notified of fall: Dr. Yanez and Dr. Green    Family notification: Family present    Factors contributing to fall:     Physical: Deconditioned, Unsteady gait, and Weakness/fatigue     Psychological: Alert and Oriented     Environmental: Equipment     Medications received in the past 8 hours:   Medication(s) Administered in past 8 Hours from 04/10/2025 0613 to 04/10/2025 1413       Date/Time Order Dose Route Action Action by Comments    04/10/2025 0939 CDT amLODIPine (Norvasc) tab 10 mg 10 mg Oral Given Minerva Cochran RN --    04/10/2025 0939 CDT apixaban (Eliquis) tab 5 mg 5 mg Oral Given Minerva Cochran RN --    04/10/2025 1228 CDT HYDROcodone-acetaminophen (Norco) 5-325 MG per tab 1 tablet 1 tablet Oral Given Minerva Cochran RN --    04/10/2025 1407 CDT loperamide (Imodium) cap 2 mg 2 mg Oral Given Minerva Cochran RN --    04/10/2025 0939 CDT magnesium oxide (Mag-Ox) tab 400 mg 400 mg Oral Given Minerva Cochran RN --    04/10/2025 0939 CDT multivitamin (Tab-A-Immanuel/Beta  Sutures out in 12 to 14 days.  Keep wound clean and dry for the first 24 hours.  You may then shower.  Return for signs of infection.     Carotene) tab 1 tablet 1 tablet Oral Given Minerva Cochran, RN --            Was patient identified as high fall risk prior to fall: yes                          What interventions were in place prior to fall: Assistive devices (wheelchair, commode, walker, gait belt), Bed alarm, Bed in lowest position, Call light within reach, Chair alarm, Low bed ([facility] only), [place] footwear, Patient situated close to nursing station, Personal items within reach, Initiated PT/OT therapy, Rounding, and Toileting regimen    Interventions post fall: Assistive devices (wheelchair, commode, walker, gait belt), Bed alarm, Bed in lowest position, Call light within reach, Chair alarm, Low bed ([facility] only), [place] footwear, Patient situated close to nursing station, Personal items within reach, Initiated PT/OT therapy, Rounding, and Toileting regimen    Additional comments: